# Patient Record
Sex: FEMALE | Race: WHITE | NOT HISPANIC OR LATINO | Employment: FULL TIME | URBAN - METROPOLITAN AREA
[De-identification: names, ages, dates, MRNs, and addresses within clinical notes are randomized per-mention and may not be internally consistent; named-entity substitution may affect disease eponyms.]

---

## 2017-02-01 ENCOUNTER — TRANSCRIBE ORDERS (OUTPATIENT)
Dept: ADMINISTRATIVE | Facility: HOSPITAL | Age: 57
End: 2017-02-01

## 2017-02-01 DIAGNOSIS — Z13.9 SCREENING: Primary | ICD-10-CM

## 2017-02-09 ENCOUNTER — HOSPITAL ENCOUNTER (OUTPATIENT)
Dept: RADIOLOGY | Facility: HOSPITAL | Age: 57
Discharge: HOME | End: 2017-02-09
Attending: OBSTETRICS & GYNECOLOGY
Payer: COMMERCIAL

## 2017-02-09 DIAGNOSIS — Z13.9 SCREENING: ICD-10-CM

## 2017-02-09 PROCEDURE — G0202 SCR MAMMO BI INCL CAD: HCPCS

## 2018-03-28 ENCOUNTER — TRANSCRIBE ORDERS (OUTPATIENT)
Dept: ADMINISTRATIVE | Facility: HOSPITAL | Age: 58
End: 2018-03-28

## 2018-03-28 DIAGNOSIS — Z12.39 SCREENING BREAST EXAMINATION: Primary | ICD-10-CM

## 2018-04-02 ENCOUNTER — HOSPITAL ENCOUNTER (OUTPATIENT)
Dept: RADIOLOGY | Facility: HOSPITAL | Age: 58
Discharge: HOME | End: 2018-04-02
Attending: OBSTETRICS & GYNECOLOGY
Payer: COMMERCIAL

## 2018-04-02 DIAGNOSIS — Z12.39 SCREENING BREAST EXAMINATION: ICD-10-CM

## 2018-04-02 PROCEDURE — 77067 SCR MAMMO BI INCL CAD: CPT

## 2018-11-28 ENCOUNTER — APPOINTMENT (OUTPATIENT)
Dept: RADIOLOGY | Facility: CLINIC | Age: 58
End: 2018-11-28
Payer: COMMERCIAL

## 2018-11-28 ENCOUNTER — OFFICE VISIT (OUTPATIENT)
Dept: OBGYN CLINIC | Facility: CLINIC | Age: 58
End: 2018-11-28
Payer: COMMERCIAL

## 2018-11-28 VITALS
WEIGHT: 172.2 LBS | SYSTOLIC BLOOD PRESSURE: 144 MMHG | HEART RATE: 87 BPM | DIASTOLIC BLOOD PRESSURE: 93 MMHG | HEIGHT: 60 IN | BODY MASS INDEX: 33.81 KG/M2

## 2018-11-28 DIAGNOSIS — M25.561 RIGHT KNEE PAIN, UNSPECIFIED CHRONICITY: ICD-10-CM

## 2018-11-28 DIAGNOSIS — M17.11 PRIMARY OSTEOARTHRITIS OF RIGHT KNEE: Primary | ICD-10-CM

## 2018-11-28 DIAGNOSIS — M22.2X1 PATELLOFEMORAL DISORDER OF RIGHT KNEE: ICD-10-CM

## 2018-11-28 PROCEDURE — 73562 X-RAY EXAM OF KNEE 3: CPT

## 2018-11-28 PROCEDURE — 20610 DRAIN/INJ JOINT/BURSA W/O US: CPT | Performed by: ORTHOPAEDIC SURGERY

## 2018-11-28 PROCEDURE — 99204 OFFICE O/P NEW MOD 45 MIN: CPT | Performed by: ORTHOPAEDIC SURGERY

## 2018-11-28 RX ORDER — DOXAZOSIN 2 MG/1
TABLET ORAL
Refills: 0 | COMMUNITY
Start: 2018-11-07

## 2018-11-28 RX ORDER — METOPROLOL SUCCINATE 50 MG/1
TABLET, EXTENDED RELEASE ORAL
Refills: 0 | COMMUNITY
Start: 2018-11-07

## 2018-11-28 RX ORDER — TRAZODONE HYDROCHLORIDE 50 MG/1
TABLET ORAL
Refills: 0 | COMMUNITY
Start: 2018-11-07

## 2018-11-28 RX ORDER — METFORMIN HYDROCHLORIDE 750 MG/1
1500 TABLET, EXTENDED RELEASE ORAL DAILY
Refills: 0 | COMMUNITY
Start: 2018-11-16

## 2018-11-28 RX ORDER — MOMETASONE FUROATE 50 UG/1
SPRAY, METERED NASAL
Refills: 0 | COMMUNITY
Start: 2018-10-31

## 2018-11-28 RX ORDER — TRIAMTERENE AND HYDROCHLOROTHIAZIDE 37.5; 25 MG/1; MG/1
CAPSULE ORAL
Refills: 0 | COMMUNITY
Start: 2018-10-31

## 2018-11-28 RX ORDER — TRIAMCINOLONE ACETONIDE 40 MG/ML
40 INJECTION, SUSPENSION INTRA-ARTICULAR; INTRAMUSCULAR
Status: COMPLETED | OUTPATIENT
Start: 2018-11-28 | End: 2018-11-28

## 2018-11-28 RX ORDER — BUPIVACAINE HYDROCHLORIDE 5 MG/ML
6 INJECTION, SOLUTION EPIDURAL; INTRACAUDAL
Status: COMPLETED | OUTPATIENT
Start: 2018-11-28 | End: 2018-11-28

## 2018-11-28 RX ORDER — ROSUVASTATIN CALCIUM 20 MG/1
TABLET, COATED ORAL
Refills: 0 | COMMUNITY
Start: 2018-10-31

## 2018-11-28 RX ORDER — LEVOTHYROXINE SODIUM 75 MCG
75 TABLET ORAL DAILY
Refills: 0 | COMMUNITY
Start: 2018-11-08

## 2018-11-28 RX ADMIN — BUPIVACAINE HYDROCHLORIDE 6 ML: 5 INJECTION, SOLUTION EPIDURAL; INTRACAUDAL at 15:16

## 2018-11-28 RX ADMIN — TRIAMCINOLONE ACETONIDE 40 MG: 40 INJECTION, SUSPENSION INTRA-ARTICULAR; INTRAMUSCULAR at 15:16

## 2018-11-28 NOTE — PROGRESS NOTES
Scribe Attestation    I,:   Meeta Randolph MA am acting as a scribe while in the presence of the attending physician :        I,:   Emili Hayden DO personally performed the services described in this documentation    as scribed in my presence :              Assessment/Plan:  1  Primary osteoarthritis of right knee     2  Right knee pain, unspecified chronicity  XR knee 3 vw right non injury   3  Patellofemoral disorder of right knee  Ambulatory referral to Physical Therapy    Brace     Avis Kee is a new patient who presents today with right knee pain  A discussion was had with the patient regarding patellofemoral arthritis of her right knee  She should modify her activities to low impact exercises such as swimming, biking, or use of the elliptical  The patient should also avoid kneeling, lifting or squatting activities  Several options were presented to the patient to help provide her with pain relief  Today, she was offered a daily anti inflammatory or to drain the fluid from her knee and to receive a steroid injection  The patient has opted for the aspiration and steroid injection today  Risks and benefits of the injection and post injection instructions were provided to the patient  A lateral J brace was also provided today in the office for he right knee  Avis Kee will also be referred to physical therapy 2-3x a week  She will follow up with me in 3 months time  All of her questions were addressed today     Subjective: Right knee pain     Patient ID: Nitin Pack is a 66 y o  female  HPI  Avis Kee is a new patient who presents today regarding her right knee pain  She reports this pain started 2 weeks ago  Avis Kee usually goes to the gym 2-3x a week and walks on the treadmill with an incline  She states while she did not notice any discomfort at the gym 2 weeks ago, she did wake up with pain in her right knee  The patient works as a  and is frequently on her feet   She does have pain while walking around and going up and down the stairs  At times she does feel some clicking in her knee  Today her pain is a 3/10  She has taken an over the counter anti inflammatory which did give her relief  She denies feeling unstable or any numbness and tingling  Review of Systems   Constitutional: Negative for chills, fever and unexpected weight change  HENT: Negative for hearing loss, nosebleeds and sore throat  Eyes: Negative for pain, redness and visual disturbance  Respiratory: Negative for cough, shortness of breath and wheezing  Cardiovascular: Negative for chest pain, palpitations and leg swelling  Gastrointestinal: Negative for abdominal pain, nausea and vomiting  Endocrine: Negative for polydipsia and polyuria  Genitourinary: Negative for dyspareunia and hematuria  Musculoskeletal: Positive for myalgias  Skin: Negative for rash and wound  Neurological: Negative for dizziness, numbness and headaches  Psychiatric/Behavioral: Negative for decreased concentration and suicidal ideas  The patient is not nervous/anxious  No past medical history on file  No past surgical history on file  Family History   Problem Relation Age of Onset    Hypertension Father        Social History     Occupational History    Not on file       Social History Main Topics    Smoking status: Former Smoker    Smokeless tobacco: Never Used    Alcohol use Not on file    Drug use: Unknown    Sexual activity: Not on file         Current Outpatient Prescriptions:     doxazosin (CARDURA) 2 mg tablet, , Disp: , Rfl: 0    metFORMIN (GLUCOPHAGE-XR) 750 mg 24 hr tablet, Take 1,500 mg by mouth daily, Disp: , Rfl: 0    metoprolol succinate (TOPROL-XL) 50 mg 24 hr tablet, , Disp: , Rfl: 0    mometasone (NASONEX) 50 mcg/act nasal spray, , Disp: , Rfl: 0    rosuvastatin (CRESTOR) 20 MG tablet, , Disp: , Rfl: 0    sertraline (ZOLOFT) 50 mg tablet, , Disp: , Rfl: 0    SYNTHROID 75 MCG tablet, Take 75 mcg by mouth daily, Disp: , Rfl: 0    traZODone (DESYREL) 50 mg tablet, , Disp: , Rfl: 0    triamterene-hydrochlorothiazide (DYAZIDE) 37 5-25 mg per capsule, , Disp: , Rfl: 0    Allergies   Allergen Reactions    Augmentin [Amoxicillin-Pot Clavulanate]     Cozaar [Losartan]        Objective:  Vitals:    11/28/18 1408   BP: 144/93   Pulse: 87       Body mass index is 33 63 kg/m²  Right Knee Exam     Tenderness   The patient is experiencing tenderness in the medial joint line (TTP lateral patella facet )  Range of Motion   Extension: 0   Flexion: 120     Tests   Kristyn:  Medial - negative Lateral - negative  Drawer:       Anterior - negative      Varus: negative  Valgus: negative  Patellar Apprehension: negative    Other   Erythema: absent  Scars: absent  Sensation: normal  Pulse: present  Swelling: none  Other tests: effusion (Trace) present    Comments:  Free and full ROM with crepitus noted   Postive para patellar crepitus   Positive patellofemoral grind   Negative appley's compression           Observations     Right Knee   Positive for effusion (Trace)  Physical Exam   Constitutional: She is oriented to person, place, and time  She appears well-developed and well-nourished  HENT:   Head: Normocephalic and atraumatic  Eyes: Conjunctivae are normal    Neck: Neck supple  Cardiovascular: Intact distal pulses  Pulmonary/Chest: Effort normal    Musculoskeletal:        Right knee: She exhibits effusion (Trace)  Neurological: She is alert and oriented to person, place, and time  Skin: Skin is warm and dry  Psychiatric: She has a normal mood and affect   Her behavior is normal      Large joint arthrocentesis  Date/Time: 11/28/2018 3:16 PM  Consent given by: patient  Site marked: site marked  Timeout: Immediately prior to procedure a time out was called to verify the correct patient, procedure, equipment, support staff and site/side marked as required   Supporting Documentation  Indications: pain and joint swelling   Procedure Details  Location: knee - R knee  Needle size: 20 G  Approach: Superior lateral   Medications administered: 6 mL bupivacaine (PF) 0 5 %; 40 mg triamcinolone acetonide 40 mg/mL    Aspirate amount (ml): 7 cc  Aspirate: yellow and clear (benign )  Fluid sample sent for laboratory analysis: Fluid was clear and benign in nature  Patient tolerance: patient tolerated the procedure well with no immediate complications  Dressing:  Sterile dressing applied        I have personally reviewed pertinent films in PACS  X-rays obtained here in the office today of the right knee were reviewed by me  They demonstrate mild osteoarthritis and tibial femoral compartments and moderate patellofemoral osteoarthritis with joint space asymmetry, lateral peripheral osteophyte formation and slight patellar tilt  No lytic or blastic lesion  No fracture appreciated  Antonieta JUNIOR    Division of Adult Reconstruction  Department of [facility]

## 2019-01-10 ENCOUNTER — EVALUATION (OUTPATIENT)
Dept: PHYSICAL THERAPY | Facility: CLINIC | Age: 59
End: 2019-01-10
Payer: COMMERCIAL

## 2019-01-10 DIAGNOSIS — M22.2X1 PATELLOFEMORAL DISORDER OF RIGHT KNEE: ICD-10-CM

## 2019-01-10 PROCEDURE — G8979 MOBILITY GOAL STATUS: HCPCS | Performed by: PHYSICAL THERAPIST

## 2019-01-10 PROCEDURE — 97161 PT EVAL LOW COMPLEX 20 MIN: CPT | Performed by: PHYSICAL THERAPIST

## 2019-01-10 PROCEDURE — G8978 MOBILITY CURRENT STATUS: HCPCS | Performed by: PHYSICAL THERAPIST

## 2019-01-10 NOTE — PROGRESS NOTES
PT Evaluation     Today's date: 1/10/2019  Patient name: Jovi Covarrubias  : 1960  MRN: [id number]  Referring provider: Trip Street DO  Dx:   Encounter Diagnosis     ICD-10-CM    1  Patellofemoral disorder of right knee M22 2X1 Ambulatory referral to Physical Therapy                  Assessment  Assessment details: Josy Beypreskerri with signs and symptoms consistent with Patellofemoral disorder of right knee, with loss of range of motion, strength and spinal stabilization  Presents with medium reactivity  Jovi Covarrubias would benefit with physical therapy to address these impairments to return to prior level of function  Impairments: abnormal or restricted ROM, activity intolerance, impaired physical strength, lacks appropriate home exercise program and pain with function    Goals  STG  Initiate HEP  Able to walk 1/2 mile without pain in 3 weeks  LTG  Independent with HEP  Able to walk 1 mile without pain in 6 weeks      Plan  Planned therapy interventions: neuromuscular re-education, patient education, strengthening, stretching, therapeutic exercise, balance and home exercise program  Frequency: 2x week  Duration in visits: 12  Duration in weeks: 6  Treatment plan discussed with: patient        Subjective Evaluation    History of Present Illness  Mechanism of injury: Patient reports developed right knee pain about 2 months ago, Dr Raymon Denny asperated the right knee and injected cortisone and it felt good for one month  The pain returned and now she is presenting to PT  Denies injury  She is very active with exercise and catering      Not a recurrent problem   Quality of life: good    Pain  Current pain ratin  At best pain ratin  Location: right knee  Quality: dull ache  Relieving factors: medications and rest  Aggravating factors: stair climbing, walking, running and lifting    Social Support  Steps to enter house: yes  Stairs in house: no   L[facility] in: [facility]  Lives with: spouse    Employment status: working  Hand dominance: right  Exercise history: Regular exercise    Treatments  Current treatment: physical therapy  Patient Goals  Patient goals for therapy: decreased pain, improved balance, increased motion, increased strength, independence with ADLs/IADLs and return to sport/leisure activities          Objective     Active Range of Motion   Left Knee   Flexion: 135 degrees   Extension: 0 degrees     Right Knee   Flexion: 135 degrees   Extension: 0 degrees     Strength/Myotome Testing     Left Hip   Planes of Motion   Flexion: 5  Extension: 5  Abduction: 5  External rotation: 5  Internal rotation: 5    Right Hip   Planes of Motion   Flexion: 4+  Extension: 4  Abduction: 4  External rotation: 4  Internal rotation: 4+    Left Knee   Flexion: 5  Extension: 5    Right Knee   Flexion: 4  Extension: 4      Flowsheet Rows      Most Recent Value   PT/OT G-Codes   Current Score  58   Projected Score  68   Assessment Type  Evaluation   G code set  Mobility: Walking & Moving Around   Mobility: Walking and Moving Around Current Status ()  CK   Mobility: Walking and Moving Around Goal Status ()  CJ          Precautions: HTN, DM    Daily Treatment Diary     Manual                                                                                   Exercise Diary  1/10            SLR F, ABD 2x10            Clamshells x10                                                                                                                                                                                                                                                          Modalities                                                       Patient instructed in HEP from [address] [place] [address] #   Catherine Mulugeta

## 2019-04-22 ENCOUNTER — TRANSCRIBE ORDERS (OUTPATIENT)
Dept: ADMINISTRATIVE | Facility: HOSPITAL | Age: 59
End: 2019-04-22

## 2019-04-22 DIAGNOSIS — Z12.39 BREAST SCREENING, UNSPECIFIED: Primary | ICD-10-CM

## 2019-04-26 ENCOUNTER — HOSPITAL ENCOUNTER (OUTPATIENT)
Dept: RADIOLOGY | Facility: HOSPITAL | Age: 59
Discharge: HOME | End: 2019-04-26
Attending: OBSTETRICS & GYNECOLOGY
Payer: COMMERCIAL

## 2019-04-26 VITALS — HEIGHT: 60 IN | BODY MASS INDEX: 33.77 KG/M2 | WEIGHT: 172 LBS

## 2019-04-26 DIAGNOSIS — Z12.39 BREAST SCREENING, UNSPECIFIED: ICD-10-CM

## 2019-04-26 PROCEDURE — 77067 SCR MAMMO BI INCL CAD: CPT

## 2019-04-26 PROCEDURE — 77063 BREAST TOMOSYNTHESIS BI: CPT

## 2020-06-10 ENCOUNTER — TRANSCRIBE ORDERS (OUTPATIENT)
Dept: ADMINISTRATIVE | Facility: HOSPITAL | Age: 60
End: 2020-06-10

## 2020-06-10 DIAGNOSIS — Z12.31 OTHER SCREENING MAMMOGRAM: Primary | ICD-10-CM

## 2020-08-04 ENCOUNTER — TRANSCRIBE ORDERS (OUTPATIENT)
Dept: ADMINISTRATIVE | Facility: HOSPITAL | Age: 60
End: 2020-08-04

## 2020-08-04 ENCOUNTER — HOSPITAL ENCOUNTER (OUTPATIENT)
Dept: RADIOLOGY | Facility: HOSPITAL | Age: 60
Discharge: HOME | End: 2020-08-04
Attending: OBSTETRICS & GYNECOLOGY
Payer: COMMERCIAL

## 2020-08-04 VITALS — BODY MASS INDEX: 33.77 KG/M2 | HEIGHT: 60 IN | WEIGHT: 172 LBS

## 2020-08-04 DIAGNOSIS — Z12.31 OTHER SCREENING MAMMOGRAM: ICD-10-CM

## 2020-08-04 PROCEDURE — 77067 SCR MAMMO BI INCL CAD: CPT

## 2020-08-04 PROCEDURE — 77063 BREAST TOMOSYNTHESIS BI: CPT

## 2021-04-05 ENCOUNTER — NURSE TRIAGE (OUTPATIENT)
Dept: OTHER | Facility: OTHER | Age: 61
End: 2021-04-05

## 2021-04-05 DIAGNOSIS — Z20.828 SARS-ASSOCIATED CORONAVIRUS EXPOSURE: Primary | ICD-10-CM

## 2021-04-05 NOTE — TELEPHONE ENCOUNTER
1  Were you within 6 feet or less, for up to 15 minutes or more with a person that has a confirmed COVID-19 test?  A coworker tested positive on 4/2/2021  2  What was the date of your exposure? Last date of contact was 4/1/2021  3  Are you experiencing any symptoms attributed to the virus?  (Assess for SOB, cough, fever, difficulty breathing)   Asymptomatic  4  HIGH RISK: Do you have any history heart or lung conditions, weakened immune system, diabetes, Asthma, CHF, HIV, COPD, Chemo, renal failure, sickle cell, etc?   Hypertension  5  PREGNANCY: Are you pregnant or did you recently give birth?  N/A

## 2021-04-05 NOTE — TELEPHONE ENCOUNTER
Regarding: ASYMPTOMATIC - EXPOSURE - COVID TEST REQUEST  ----- Message from [facility] sent at 4/5/2021 11:50 AM EDT -----  "I need to be tested due to coworker I work closely with tested positive, last known exposed Thursday 4/1   No symptoms " Concern due to  is immunocompromised

## 2021-04-07 PROCEDURE — U0005 INFEC AGEN DETEC AMPLI PROBE: HCPCS | Performed by: FAMILY MEDICINE

## 2021-04-07 PROCEDURE — U0003 INFECTIOUS AGENT DETECTION BY NUCLEIC ACID (DNA OR RNA); SEVERE ACUTE RESPIRATORY SYNDROME CORONAVIRUS 2 (SARS-COV-2) (CORONAVIRUS DISEASE [COVID-19]), AMPLIFIED PROBE TECHNIQUE, MAKING USE OF HIGH THROUGHPUT TECHNOLOGIES AS DESCRIBED BY CMS-2020-01-R: HCPCS | Performed by: FAMILY MEDICINE

## 2021-04-08 ENCOUNTER — TELEPHONE (OUTPATIENT)
Dept: OTHER | Facility: OTHER | Age: 61
End: 2021-04-08

## 2021-04-08 LAB — SARS-COV-2 RNA RESP QL NAA+PROBE: NEGATIVE

## 2021-04-08 NOTE — TELEPHONE ENCOUNTER
1st attempt  The patient was called for notification of a test result for COVID-19  The patient did not answer the phone and a voicemail was left requesting a call back to [phone], Option 7

## 2021-04-08 NOTE — TELEPHONE ENCOUNTER
2nd attempt  Pt removed from results queue  The patient was called for notification of a test result for COVID-19  The patient did not answer the phone and a voicemail was left requesting a call back to [phone], Option 7

## 2021-09-22 ENCOUNTER — HOSPITAL ENCOUNTER (OUTPATIENT)
Dept: RADIOLOGY | Facility: HOSPITAL | Age: 61
Discharge: HOME | End: 2021-09-22
Payer: COMMERCIAL

## 2021-09-22 VITALS — WEIGHT: 165 LBS | HEIGHT: 60 IN | BODY MASS INDEX: 32.39 KG/M2

## 2021-09-22 DIAGNOSIS — Z12.31 ENCOUNTER FOR SCREENING MAMMOGRAM FOR MALIGNANT NEOPLASM OF BREAST: ICD-10-CM

## 2021-09-22 PROCEDURE — 77067 SCR MAMMO BI INCL CAD: CPT

## 2021-09-22 PROCEDURE — 77063 BREAST TOMOSYNTHESIS BI: CPT

## 2021-10-05 ENCOUNTER — TELEPHONE (OUTPATIENT)
Dept: GASTROENTEROLOGY | Facility: CLINIC | Age: 61
End: 2021-10-05

## 2022-01-24 ENCOUNTER — OFFICE VISIT (OUTPATIENT)
Dept: URGENT CARE | Facility: CLINIC | Age: 62
End: 2022-01-24
Payer: COMMERCIAL

## 2022-01-24 VITALS
HEART RATE: 82 BPM | SYSTOLIC BLOOD PRESSURE: 185 MMHG | OXYGEN SATURATION: 98 % | TEMPERATURE: 96.1 F | DIASTOLIC BLOOD PRESSURE: 89 MMHG | RESPIRATION RATE: 18 BRPM

## 2022-01-24 DIAGNOSIS — R51.9 FACIAL PAIN: Primary | ICD-10-CM

## 2022-01-24 PROCEDURE — 99214 OFFICE O/P EST MOD 30 MIN: CPT | Performed by: PHYSICIAN ASSISTANT

## 2022-01-24 PROCEDURE — U0003 INFECTIOUS AGENT DETECTION BY NUCLEIC ACID (DNA OR RNA); SEVERE ACUTE RESPIRATORY SYNDROME CORONAVIRUS 2 (SARS-COV-2) (CORONAVIRUS DISEASE [COVID-19]), AMPLIFIED PROBE TECHNIQUE, MAKING USE OF HIGH THROUGHPUT TECHNOLOGIES AS DESCRIBED BY CMS-2020-01-R: HCPCS | Performed by: PHYSICIAN ASSISTANT

## 2022-01-24 PROCEDURE — U0005 INFEC AGEN DETEC AMPLI PROBE: HCPCS | Performed by: PHYSICIAN ASSISTANT

## 2022-01-24 RX ORDER — DOXYCYCLINE 100 MG/1
100 TABLET ORAL 2 TIMES DAILY
Qty: 14 TABLET | Refills: 0 | Status: SHIPPED | OUTPATIENT
Start: 2022-01-24 | End: 2022-01-31

## 2022-01-24 NOTE — PROGRESS NOTES
[facility]        NAME: Lamonte Vasquez is a 63 y o  female  : 1960    MRN: [id number]  DATE: 2022  TIME: 5:43 PM    Assessment and Plan   Facial pain [R51 9]  1  Facial pain  doxycycline (ADOXA) 100 MG tablet         Patient Instructions   Patient Instructions     Doxy 2x a day for 7 days     A sinus infection is most often caused by a virus  Treatment for a sinus infection focuses on symptomatic management as it typically resolves within 7 to 10 days  There are no treatments to shorten the clinical course of the disease  Patients with a viral sinus infection should be managed with supportive care only  Bacterial infection occurs in only 0 5 to 2 percent of episodes of sinus infections  Acute bacterial sinus infections may also be a self-limited disease and resolve without antibiotics  However, if your symptoms do last past 7 days you may call the office back and I will send in an antibiotic for you  According to the [address] [place] [address] [employer]- [employer] patients with a sinus infection should have a seven day waiting period with symptomatic management  Upon day seven if there is no improvement an antibiotic should then be initiated  Symptomatic management:     - Nasal congestion: Nasal irrigation with nasal saline or intranasal glucocorticoids (FLONASE)           Short course of oral decongestants (Sudafed) 3-5 days           (Guaifenesin) may help thin secretions and may promote ease of mucus drainage and clearance           Inhalation of warm, humidified air (steam), may provide patients with a transient sense of relief of congestion                - Pain and Fever: Over-the-counter analgesics and antipyretics such as nonsteroidal antiinflammatory drugs (NSAIDs) and acetaminophen may be used                     Hypertensive Crisis   WHAT YOU NEED TO KNOW:   A hypertensive crisis is a sudden spike in blood pressure to 180/120 or higher   A normal blood pressure is 119/79 or lower  A hypertensive crisis is also known as acute hypertension  This is a medical emergency that could lead to organ damage or be life-threatening  DISCHARGE INSTRUCTIONS:   Call 911 for any of the following:   · You have chest    · You have back pain or shortness of breath  · You have weakness or numbness in your face, arms, or legs  · You cannot see or talk as well as usual     Return to the emergency department if:   · You have a severe headache  Contact your healthcare provider if:   · Your blood pressure is 180/110 or higher but you have no other symptoms  · You have questions or concerns about your condition or care  Medicines: You may need any of the following:  · Blood pressure medicine  is given to lower your blood pressure  There are many different types of blood pressure medicine, and you may need more than one type  It is very important to take your blood pressure medicine exactly as directed  Skipped doses can lead to a hypertensive crisis  Talk to your healthcare provider if you are having side effects from your medicine  Do not  stop taking it without talking to your healthcare provider  · Diuretics  help decrease extra fluid that collects in your blood vessels  This lowers your blood pressure by reducing pressure in your arteries  Diuretics are often called water pills  You may urinate more often while you take this medicine  · Take your medicine as directed  Contact your healthcare provider if you think your medicine is not helping or if you have side effects  Tell him of her if you are allergic to any medicine  Keep a list of the medicines, vitamins, and herbs you take  Include the amounts, and when and why you take them  Bring the list or the pill bottles to follow-up visits  Carry your medicine list with you in case of an emergency  Follow up with your healthcare provider within 1 to 5 days or as directed:   You will need to return to have your blood pressure checked and other tests  Your healthcare provider may also refer to you a cardiologist  Write down your questions so you remember to ask them during your visits  Prevent another hypertensive crisis:   · Check your blood pressure at home  Sit and rest for 5 minutes before you take your blood pressure  Extend your arm and support it on a flat surface  Your arm should be at the same level as your heart  Follow the directions that came with your blood pressure monitor  If possible, take at least 2 blood pressure readings each time  Take your blood pressure at least twice a day at the same times each day, such as morning and evening  Keep a record of your readings and bring it to your follow-up visits  Ask your healthcare provider what your blood pressure should be  · Manage any other health conditions you have  Health conditions such as diabetes can increase your risk for hypertension  Follow your healthcare provider's instructions and take all your medicines as directed  · Ask about all medicines  Certain medicines can increase your blood pressure  Examples include oral birth control pills, decongestants, herbal supplements, and NSAIDs, such as ibuprofen  Your healthcare provider can tell you which medicines are safe for you to take  This includes prescription and over-the-counter medicines  · Limit sodium (salt) as directed  Too much sodium can affect your fluid balance  Check labels to find low-sodium or no-salt-added foods  Some low-sodium foods use potassium salts for flavor  Too much potassium can also cause health problems  Your healthcare provider will tell you how much sodium and potassium are safe for you to have in a day  He or she may recommend that you limit sodium to 2,300 mg a day  · Follow the meal plan recommended by your healthcare provider    A dietitian or your provider can give you more information on low-sodium plans or the DASH (Dietary Approaches to Stop Hypertension) eating plan  The DASH plan is low in sodium, unhealthy fats, and total fat  It is high in potassium, calcium, and fiber  · Exercise to maintain a healthy weight  Exercise at least 30 minutes per day, on most days of the week  This will help decrease your blood pressure  Ask your healthcare provider about the best exercise plan for you  · Decrease stress  This may help lower your blood pressure  Learn ways to relax, such as deep breathing or listening to music  · Limit alcohol as directed  Alcohol can increase your blood pressure  A drink of alcohol is 12 ounces of beer, 5 ounces of wine, or 1½ ounces of liquor  · Do not smoke  Nicotine and other chemicals in cigarettes and cigars can increase your blood pressure and also cause lung damage  Ask your healthcare provider for information if you currently smoke and need help to quit  E-cigarettes or smokeless tobacco still contain nicotine  Talk to your healthcare provider before you use these products  © Copyright [employer] 2021 Information is for End User's use only and may not be sold, redistributed or otherwise used for commercial purposes  All illustrations and images included in CareNotes® are the copyrighted property of A D A M , Inc  or [facility]  The above information is an  only  It is not intended as medical advice for individual conditions or treatments  Talk to your doctor, nurse or pharmacist before following any medical regimen to see if it is safe and effective for you  Follow up with PCP in 3-5 days  Proceed to  ER if symptoms worsen  Chief Complaint     Chief Complaint   Patient presents with    Sinusitis     FACIAL PAIN, NECK PAIN,HEADACHE TOOK ADVIL          History of Present Illness       The pt was a 61-year-old female presenting with a headache, facial pain and neck pain x 5-6 days  She reports getting this every year   PT does have a Pmh of HTN        Review of Systems   Review of Systems   Constitutional: Negative for activity change, appetite change, chills, fatigue and fever  HENT: Positive for congestion  Negative for rhinorrhea, sinus pressure, sinus pain and sore throat  Facial pain and pressure      Respiratory: Negative for cough, chest tightness and shortness of breath  Cardiovascular: Negative for chest pain and palpitations  Gastrointestinal: Negative for diarrhea, nausea and vomiting  Musculoskeletal: Positive for neck pain  Negative for arthralgias and myalgias  Skin: Negative for color change and pallor  Neurological: Positive for headaches  Current Medications       Current Outpatient Medications:     doxazosin (CARDURA) 2 mg tablet, , Disp: , Rfl: 0    metFORMIN (GLUCOPHAGE-XR) 750 mg 24 hr tablet, Take 1,500 mg by mouth daily, Disp: , Rfl: 0    metoprolol succinate (TOPROL-XL) 50 mg 24 hr tablet, , Disp: , Rfl: 0    mometasone (NASONEX) 50 mcg/act nasal spray, , Disp: , Rfl: 0    rosuvastatin (CRESTOR) 20 MG tablet, , Disp: , Rfl: 0    sertraline (ZOLOFT) 50 mg tablet, , Disp: , Rfl: 0    SYNTHROID 75 MCG tablet, Take 75 mcg by mouth daily, Disp: , Rfl: 0    traZODone (DESYREL) 50 mg tablet, , Disp: , Rfl: 0    triamterene-hydrochlorothiazide (DYAZIDE) 37 5-25 mg per capsule, , Disp: , Rfl: 0    doxycycline (ADOXA) 100 MG tablet, Take 1 tablet (100 mg total) by mouth 2 (two) times a day for 7 days, Disp: 14 tablet, Rfl: 0    Current Allergies     Allergies as of 01/24/2022 - Reviewed 01/24/2022   Allergen Reaction Noted    Augmentin [amoxicillin-pot clavulanate]  11/28/2018    Cozaar [losartan]  11/28/2018            The following portions of the patient's history were reviewed and updated as appropriate: allergies, current medications, past family history, past medical history, past social history, past surgical history and problem list      No past medical history on file      No past surgical history on file  Family History   Problem Relation Age of Onset    Hypertension Father     No Known Problems Paternal Aunt          Medications have been verified  Objective   BP (!) 185/89   Pulse 82   Temp (!) 96 1 °F (35 6 °C)   Resp 18   SpO2 98%        Physical Exam     Physical Exam  Vitals and nursing note reviewed  Constitutional:       General: She is not in acute distress  Appearance: She is normal weight  She is not ill-appearing, toxic-appearing or diaphoretic  Comments: Pt appears to be in pain     HENT:      Head: Normocephalic and atraumatic  Right Ear: Tympanic membrane, ear canal and external ear normal  There is no impacted cerumen  Left Ear: Tympanic membrane, ear canal and external ear normal  There is no impacted cerumen  Nose: Nose normal  No congestion or rhinorrhea  Mouth/Throat:      Mouth: Mucous membranes are moist       Pharynx: Oropharynx is clear  No oropharyngeal exudate or posterior oropharyngeal erythema  Eyes:      General:         Right eye: No discharge  Left eye: No discharge  Extraocular Movements: Extraocular movements intact  Conjunctiva/sclera: Conjunctivae normal       Pupils: Pupils are equal, round, and reactive to light  Cardiovascular:      Rate and Rhythm: Normal rate and regular rhythm  Heart sounds: Normal heart sounds  No murmur heard  No friction rub  No gallop  Pulmonary:      Effort: Pulmonary effort is normal  No respiratory distress  Breath sounds: Normal breath sounds  No stridor  No wheezing, rhonchi or rales  Chest:      Chest wall: No tenderness  Abdominal:      General: Abdomen is flat  Bowel sounds are normal  There is no distension  Palpations: Abdomen is soft  There is no mass  Tenderness: There is no abdominal tenderness  There is no guarding or rebound  Hernia: No hernia is present  Musculoskeletal:         General: Normal range of motion     Skin: General: Skin is warm and dry  Capillary Refill: Capillary refill takes less than 2 seconds  Neurological:      Mental Status: She is alert

## 2022-01-24 NOTE — PATIENT INSTRUCTIONS
Doxy 2x a day for 7 days     A sinus infection is most often caused by a virus  Treatment for a sinus infection focuses on symptomatic management as it typically resolves within 7 to 10 days  There are no treatments to shorten the clinical course of the disease  Patients with a viral sinus infection should be managed with supportive care only  Bacterial infection occurs in only 0 5 to 2 percent of episodes of sinus infections  Acute bacterial sinus infections may also be a self-limited disease and resolve without antibiotics  However, if your symptoms do last past 7 days you may call the office back and I will send in an antibiotic for you  According to the [address] [place] [address] [employer]- [employer]ck [employer] patients with a sinus infection should have a seven day waiting period with symptomatic management  Upon day seven if there is no improvement an antibiotic should then be initiated  Symptomatic management:     - Nasal congestion: Nasal irrigation with nasal saline or intranasal glucocorticoids (FLONASE)           Short course of oral decongestants (Sudafed) 3-5 days           (Guaifenesin) may help thin secretions and may promote ease of mucus drainage and clearance           Inhalation of warm, humidified air (steam), may provide patients with a transient sense of relief of congestion                - Pain and Fever: Over-the-counter analgesics and antipyretics such as nonsteroidal antiinflammatory drugs (NSAIDs) and acetaminophen may be used                     Hypertensive Crisis   WHAT YOU NEED TO KNOW:   A hypertensive crisis is a sudden spike in blood pressure to 180/120 or higher  A normal blood pressure is 119/79 or lower  A hypertensive crisis is also known as acute hypertension  This is a medical emergency that could lead to organ damage or be life-threatening         DISCHARGE INSTRUCTIONS:   Call 911 for any of the following:   · You have chest    · You have back pain or shortness of breath  · You have weakness or numbness in your face, arms, or legs  · You cannot see or talk as well as usual     Return to the emergency department if:   · You have a severe headache  Contact your healthcare provider if:   · Your blood pressure is 180/110 or higher but you have no other symptoms  · You have questions or concerns about your condition or care  Medicines: You may need any of the following:  · Blood pressure medicine  is given to lower your blood pressure  There are many different types of blood pressure medicine, and you may need more than one type  It is very important to take your blood pressure medicine exactly as directed  Skipped doses can lead to a hypertensive crisis  Talk to your healthcare provider if you are having side effects from your medicine  Do not  stop taking it without talking to your healthcare provider  · Diuretics  help decrease extra fluid that collects in your blood vessels  This lowers your blood pressure by reducing pressure in your arteries  Diuretics are often called water pills  You may urinate more often while you take this medicine  · Take your medicine as directed  Contact your healthcare provider if you think your medicine is not helping or if you have side effects  Tell him of her if you are allergic to any medicine  Keep a list of the medicines, vitamins, and herbs you take  Include the amounts, and when and why you take them  Bring the list or the pill bottles to follow-up visits  Carry your medicine list with you in case of an emergency  Follow up with your healthcare provider within 1 to 5 days or as directed: You will need to return to have your blood pressure checked and other tests  Your healthcare provider may also refer to you a cardiologist  Write down your questions so you remember to ask them during your visits  Prevent another hypertensive crisis:   · Check your blood pressure at home    Sit and rest for 5 minutes before you take your blood pressure  Extend your arm and support it on a flat surface  Your arm should be at the same level as your heart  Follow the directions that came with your blood pressure monitor  If possible, take at least 2 blood pressure readings each time  Take your blood pressure at least twice a day at the same times each day, such as morning and evening  Keep a record of your readings and bring it to your follow-up visits  Ask your healthcare provider what your blood pressure should be  · Manage any other health conditions you have  Health conditions such as diabetes can increase your risk for hypertension  Follow your healthcare provider's instructions and take all your medicines as directed  · Ask about all medicines  Certain medicines can increase your blood pressure  Examples include oral birth control pills, decongestants, herbal supplements, and NSAIDs, such as ibuprofen  Your healthcare provider can tell you which medicines are safe for you to take  This includes prescription and over-the-counter medicines  · Limit sodium (salt) as directed  Too much sodium can affect your fluid balance  Check labels to find low-sodium or no-salt-added foods  Some low-sodium foods use potassium salts for flavor  Too much potassium can also cause health problems  Your healthcare provider will tell you how much sodium and potassium are safe for you to have in a day  He or she may recommend that you limit sodium to 2,300 mg a day  · Follow the meal plan recommended by your healthcare provider  A dietitian or your provider can give you more information on low-sodium plans or the DASH (Dietary Approaches to Stop Hypertension) eating plan  The DASH plan is low in sodium, unhealthy fats, and total fat  It is high in potassium, calcium, and fiber  · Exercise to maintain a healthy weight  Exercise at least 30 minutes per day, on most days of the week   This will help decrease your blood pressure  Ask your healthcare provider about the best exercise plan for you  · Decrease stress  This may help lower your blood pressure  Learn ways to relax, such as deep breathing or listening to music  · Limit alcohol as directed  Alcohol can increase your blood pressure  A drink of alcohol is 12 ounces of beer, 5 ounces of wine, or 1½ ounces of liquor  · Do not smoke  Nicotine and other chemicals in cigarettes and cigars can increase your blood pressure and also cause lung damage  Ask your healthcare provider for information if you currently smoke and need help to quit  E-cigarettes or smokeless tobacco still contain nicotine  Talk to your healthcare provider before you use these products  © Copyright [employer] 2021 Information is for End User's use only and may not be sold, redistributed or otherwise used for commercial purposes  All illustrations and images included in CareNotes® are the copyrighted property of [employer]  or [facility] Elana Smith   The above information is an  only  It is not intended as medical advice for individual conditions or treatments  Talk to your doctor, nurse or pharmacist before following any medical regimen to see if it is safe and effective for you

## 2022-01-25 LAB — SARS-COV-2 RNA RESP QL NAA+PROBE: NEGATIVE

## 2022-07-11 ENCOUNTER — APPOINTMENT (OUTPATIENT)
Dept: LAB | Facility: CLINIC | Age: 62
End: 2022-07-11
Payer: COMMERCIAL

## 2022-09-22 ENCOUNTER — APPOINTMENT (OUTPATIENT)
Dept: LAB | Facility: CLINIC | Age: 62
End: 2022-09-22
Payer: COMMERCIAL

## 2022-12-12 ENCOUNTER — OFFICE VISIT (OUTPATIENT)
Dept: URGENT CARE | Facility: CLINIC | Age: 62
End: 2022-12-12

## 2022-12-12 VITALS
WEIGHT: 61 LBS | TEMPERATURE: 98 F | BODY MASS INDEX: 11.98 KG/M2 | HEIGHT: 60 IN | OXYGEN SATURATION: 97 % | RESPIRATION RATE: 18 BRPM | HEART RATE: 102 BPM

## 2022-12-12 DIAGNOSIS — B34.9 VIRAL SYNDROME: Primary | ICD-10-CM

## 2022-12-12 RX ORDER — BENZONATATE 100 MG/1
100 CAPSULE ORAL 3 TIMES DAILY PRN
Qty: 20 CAPSULE | Refills: 0 | Status: SHIPPED | OUTPATIENT
Start: 2022-12-12

## 2022-12-12 RX ORDER — OSELTAMIVIR PHOSPHATE 75 MG/1
75 CAPSULE ORAL EVERY 12 HOURS SCHEDULED
Qty: 10 CAPSULE | Refills: 0 | Status: SHIPPED | OUTPATIENT
Start: 2022-12-12 | End: 2022-12-17

## 2022-12-12 NOTE — LETTER
December 12, 2022     Patient: Genaro Solis  YOB: 1960  Date of Visit: 12/12/2022      To Whom it May Concern:    Chong Garcia is under my professional care  Vee Carr was seen in my office on 12/12/2022  Vee Carr may return to work on once she is 24 hours fever free pending COVID results and if she is feeling better       If you have any questions or concerns, please don't hesitate to call           Sincerely,          Salem CASSIDY Garcia        CC: No Recipients Bilobed Flap Text: The defect edges were debeveled with a #15 scalpel blade.  Given the location of the defect and the proximity to free margins a bilobe flap was deemed most appropriate.  Using a sterile surgical marker, an appropriate bilobe flap drawn around the defect.    The area thus outlined was incised deep to adipose tissue with a #15 scalpel blade.  The skin margins were undermined to an appropriate distance in all directions utilizing iris scissors.

## 2022-12-12 NOTE — PROGRESS NOTES
[facility]        NAME: Marita Maynard is a 63 y o  female  : 1960    MRN: [id number]  DATE: 2022  TIME: 4:23 PM    Assessment and Plan   Viral syndrome [B34 9]  1  Viral syndrome  Covid/Flu-Office Collect    oseltamivir (TAMIFLU) 75 mg capsule    benzonatate (TESSALON PERLES) 100 mg capsule            Patient Instructions   Patient Instructions   Tessalon for cough   Tamiflu if you test positive for flu         Follow up with PCP in 3-5 days  Proceed to  ER if symptoms worsen  Chief Complaint     Chief Complaint   Patient presents with   • Cough     Cough, sore throat,runny nose         History of Present Illness       The pt is a 53-year-old female presenting for a cough, sore throat and rhinorrhea  Today in the office she is sweating and reported taking advil  1 epsiode of vomiting this am        Review of Systems   Review of Systems   Constitutional: Negative for activity change, appetite change, chills, fatigue and fever  HENT: Positive for rhinorrhea and sore throat  Negative for congestion, ear pain, sinus pressure and sinus pain  Eyes: Negative for pain and visual disturbance  Respiratory: Positive for cough  Negative for chest tightness and shortness of breath  Cardiovascular: Negative for chest pain and palpitations  Gastrointestinal: Positive for vomiting  Negative for abdominal pain, diarrhea and nausea  Genitourinary: Negative for dysuria and hematuria  Musculoskeletal: Negative for arthralgias, back pain and myalgias  Skin: Negative for color change, pallor and rash  Neurological: Negative for seizures, syncope and headaches  All other systems reviewed and are negative          Current Medications       Current Outpatient Medications:   •  benzonatate (TESSALON PERLES) 100 mg capsule, Take 1 capsule (100 mg total) by mouth 3 (three) times a day as needed for cough, Disp: 20 capsule, Rfl: 0  •  doxazosin (CARDURA) 2 mg tablet, , Disp: , Rfl: 0  •  metFORMIN (GLUCOPHAGE-XR) 750 mg 24 hr tablet, Take 1,500 mg by mouth daily, Disp: , Rfl: 0  •  metoprolol succinate (TOPROL-XL) 50 mg 24 hr tablet, , Disp: , Rfl: 0  •  mometasone (NASONEX) 50 mcg/act nasal spray, , Disp: , Rfl: 0  •  oseltamivir (TAMIFLU) 75 mg capsule, Take 1 capsule (75 mg total) by mouth every 12 (twelve) hours for 5 days, Disp: 10 capsule, Rfl: 0  •  rosuvastatin (CRESTOR) 20 MG tablet, , Disp: , Rfl: 0  •  sertraline (ZOLOFT) 50 mg tablet, , Disp: , Rfl: 0  •  SYNTHROID 75 MCG tablet, Take 75 mcg by mouth daily, Disp: , Rfl: 0  •  traZODone (DESYREL) 50 mg tablet, , Disp: , Rfl: 0  •  triamterene-hydrochlorothiazide (DYAZIDE) 37 5-25 mg per capsule, , Disp: , Rfl: 0    Current Allergies     Allergies as of 12/12/2022 - Reviewed 12/12/2022   Allergen Reaction Noted   • Augmentin [amoxicillin-pot clavulanate]  11/28/2018   • Cozaar [losartan]  11/28/2018            The following portions of the patient's history were reviewed and updated as appropriate: allergies, current medications, past family history, past medical history, past social history, past surgical history and problem list      No past medical history on file  No past surgical history on file  Family History   Problem Relation Age of Onset   • Hypertension Father    • No Known Problems Paternal Aunt          Medications have been verified  Objective   Pulse 102   Temp 98 °F (36 7 °C)   Resp 18   Ht 5' (1 524 m)   Wt 27 7 kg (61 lb)   SpO2 97%   BMI 11 91 kg/m²        Physical Exam     Physical Exam  Vitals and nursing note reviewed  Constitutional:       General: She is not in acute distress  Appearance: Normal appearance  She is well-developed and normal weight  She is not ill-appearing, toxic-appearing or diaphoretic  HENT:      Head: Normocephalic and atraumatic  Right Ear: Tympanic membrane, ear canal and external ear normal  No drainage, swelling or tenderness  No middle ear effusion  Tympanic membrane is not erythematous  Left Ear: Tympanic membrane, ear canal and external ear normal  No drainage, swelling or tenderness  No middle ear effusion  Tympanic membrane is not erythematous  Nose: Nose normal  No congestion or rhinorrhea  Mouth/Throat:      Mouth: Mucous membranes are moist  No oral lesions  Pharynx: Oropharynx is clear  Uvula midline  No pharyngeal swelling, oropharyngeal exudate, posterior oropharyngeal erythema or uvula swelling  Tonsils: No tonsillar exudate or tonsillar abscesses  0 on the right  0 on the left  Eyes:      Extraocular Movements:      Right eye: Normal extraocular motion  Left eye: Normal extraocular motion  Conjunctiva/sclera: Conjunctivae normal       Pupils: Pupils are equal, round, and reactive to light  Cardiovascular:      Rate and Rhythm: Normal rate and regular rhythm  Heart sounds: Normal heart sounds  No murmur heard  No friction rub  No gallop  Pulmonary:      Effort: Pulmonary effort is normal  No respiratory distress  Breath sounds: Normal breath sounds  No stridor  No wheezing, rhonchi or rales  Chest:      Chest wall: No tenderness  Musculoskeletal:         General: Normal range of motion  Skin:     General: Skin is warm and dry  Capillary Refill: Capillary refill takes less than 2 seconds  Neurological:      Mental Status: She is alert

## 2022-12-13 LAB
FLUAV RNA RESP QL NAA+PROBE: POSITIVE
FLUBV RNA RESP QL NAA+PROBE: NEGATIVE
SARS-COV-2 RNA RESP QL NAA+PROBE: NEGATIVE

## 2023-03-23 ENCOUNTER — OFFICE VISIT (OUTPATIENT)
Dept: OBGYN CLINIC | Facility: CLINIC | Age: 63
End: 2023-03-23

## 2023-03-23 ENCOUNTER — HOSPITAL ENCOUNTER (OUTPATIENT)
Dept: RADIOLOGY | Facility: HOSPITAL | Age: 63
Discharge: HOME | End: 2023-03-23

## 2023-03-23 VITALS — BODY MASS INDEX: 32.39 KG/M2 | WEIGHT: 165 LBS | HEIGHT: 60 IN

## 2023-03-23 VITALS
DIASTOLIC BLOOD PRESSURE: 82 MMHG | SYSTOLIC BLOOD PRESSURE: 144 MMHG | BODY MASS INDEX: 31.61 KG/M2 | WEIGHT: 161 LBS | HEIGHT: 60 IN

## 2023-03-23 DIAGNOSIS — Z12.11 SCREENING FOR COLON CANCER: ICD-10-CM

## 2023-03-23 DIAGNOSIS — N90.7 SEBACEOUS CYST OF LABIA: ICD-10-CM

## 2023-03-23 DIAGNOSIS — Z12.31 ENCOUNTER FOR SCREENING MAMMOGRAM FOR MALIGNANT NEOPLASM OF BREAST: ICD-10-CM

## 2023-03-23 DIAGNOSIS — Z01.419 WOMEN'S ANNUAL ROUTINE GYNECOLOGICAL EXAMINATION: Primary | ICD-10-CM

## 2023-03-23 DIAGNOSIS — E11.9 DIABETES MELLITUS WITH NO COMPLICATION (HCC): ICD-10-CM

## 2023-03-23 NOTE — PROGRESS NOTES
[facility]  Well Woman Annual Exam  Erinn Romero is a 63 y o  postmenopausal female presenting for annual exam      Plan   • Pap history  uncomplicated- Reviewed ASCCP guidelines of disc ontinuing pap smears at age 72 in low risk patients with normal cervical cancer screening in the last 10 years  • [employer] [place] is low risk and does not desire or need STI testing today  • Last Mammogram: 2023, Reviewed ACOG recommendations of yearly mammogram for breast cancer screening   • Last Colonoscopy--NA, desires cologuard, ordered  • I reviewed the patient’s risk factors for osteoporosis and DEXA not ordered   • I emphasized the importance of an annual pelvic and breast exam    • I have discussed the importance of monthly self-breast exams, exercise and healthy diet as well as adequate intake of calcium and vitamin D      • All questions answered to the best of my ability     __________________________________________________________________      Zahra Laureano is a 63 y o  postmenopausal female presenting for annual exam      GYN  Menopause occurred approx 14 years ago  No bleeding  No vaginal discharge or pelvic pain  S/p salpingectomy  Menopausal symptoms: no    Sexually active: yes with , 23 years  Lives with  and two cats    Hx STI: denies   Hx Abnormal pap: yes, very remote in 25s  Last pap: approx 1-2 years ago    OB         Complaints: denies  Denies hematuria, urinary incontinence and dysuria    BREAST  Complaints: denies  Denies: breast lump, nipple discharge and skin lesion(s)  Last mammogram: 3/2023    Family hx: denies fhx of breast, uterine, ovarian, or colon cancers    GENERAL  PMH reviewed/updated and is as below  Patient does follow with a PCP  Works as a works for [employer]  Denies domestic violence      Exercise: goes to [employer] 2-3x/week  Diet: takes multivitamin and vitamin D      Past Medical History:   Diagnosis Date   • Borderline diabetes    • High blood pressure        Past Surgical History:   Procedure Laterality Date   • HEMORRHOID SURGERY     • TONSILLECTOMY     • TUBAL LIGATION           Current Outpatient Medications:   •  benzonatate (TESSALON PERLES) 100 mg capsule, Take 1 capsule (100 mg total) by mouth 3 (three) times a day as needed for cough, Disp: 20 capsule, Rfl: 0  •  doxazosin (CARDURA) 2 mg tablet, , Disp: , Rfl: 0  •  metFORMIN (GLUCOPHAGE-XR) 750 mg 24 hr tablet, Take 1,500 mg by mouth daily, Disp: , Rfl: 0  •  metoprolol succinate (TOPROL-XL) 50 mg 24 hr tablet, , Disp: , Rfl: 0  •  mometasone (NASONEX) 50 mcg/act nasal spray, , Disp: , Rfl: 0  •  rosuvastatin (CRESTOR) 20 MG tablet, , Disp: , Rfl: 0  •  sertraline (ZOLOFT) 50 mg tablet, , Disp: , Rfl: 0  •  SYNTHROID 75 MCG tablet, Take 75 mcg by mouth daily, Disp: , Rfl: 0  •  traZODone (DESYREL) 50 mg tablet, , Disp: , Rfl: 0  •  triamterene-hydrochlorothiazide (DYAZIDE) 37 5-25 mg per capsule, , Disp: , Rfl: 0    Allergies   Allergen Reactions   • Augmentin [Amoxicillin-Pot Clavulanate]    • Cozaar [Losartan]        Social History     Socioeconomic History   • Marital status: /Civil Union     Spouse name: Not on file   • Number of children: Not on file   • Years of education: Not on file   • Highest education level: Not on file   Occupational History   • Not on file   Tobacco Use   • Smoking status: Former   • Smokeless tobacco: Never   Vaping Use   • Vaping Use: Former   Substance and Sexual Activity   • Alcohol use: Yes     Comment: 3-4 x week   • Drug use: Never   • Sexual activity: Yes     Birth control/protection: Female Sterilization   Other Topics Concern   • Not on file   Social History Narrative   • Not on file     Social Determinants of Health     Financial Resource Strain: Not on file   Food Insecurity: Not on file   Transportation Needs: Not on file   Physical Activity: Not on file   Stress: Not on file   Social Connections: Not on file   Intimate Partner Violence: Not on file   Housing Stability: Not on file            Review of Systems  Review of Systems   Constitutional: Negative for unexpected weight change  Respiratory: Negative for shortness of breath  Cardiovascular: Negative for chest pain  Gastrointestinal: Negative for abdominal pain  Genitourinary: Negative for difficulty urinating and pelvic pain  Objective  /82 (BP Location: Right arm, Patient Position: Sitting)   Ht 5' (1 524 m)   Wt 73 kg (161 lb)   BMI 31 44 kg/m²      Physical Exam:  Physical Exam  Constitutional:       General: She is not in acute distress  Appearance: Normal appearance  She is not ill-appearing  HENT:      Head: Normocephalic and atraumatic  Eyes:      Extraocular Movements: Extraocular movements intact  Conjunctiva/sclera: Conjunctivae normal    Cardiovascular:      Rate and Rhythm: Normal rate and regular rhythm  Heart sounds: Normal heart sounds  Pulmonary:      Effort: Pulmonary effort is normal       Breath sounds: Normal breath sounds  Abdominal:      General: There is no distension  Palpations: Abdomen is soft  Tenderness: There is no abdominal tenderness  Genitourinary:     Comments: Vulva: sebaceous cyst of left labia at 2 o'clock otherwise normal  Vagina: normal, no lesions or ttp  Urethra: normal, no lesions, masses or ttp  Bladder: normal, no masses or ttp  Cervix: normal, no lesions, masses or CMT  Uterus: normal-size, normal mobility, good descensus  Adnexa: no masses or ttp  Musculoskeletal:         General: Normal range of motion  Cervical back: Normal range of motion  No rigidity  Skin:     General: Skin is warm and dry  Neurological:      General: No focal deficit present  Psychiatric:         Mood and Affect: Mood normal          Behavior: Behavior normal          Thought Content:  Thought content normal         Breast inspection negative, no nipple discharge or bleeding, no masses or nodularity palpable  Rectal negative, stool guaiac negative

## 2023-03-28 LAB
HPV HR 12 DNA CVX QL NAA+PROBE: NEGATIVE
HPV16 DNA CVX QL NAA+PROBE: POSITIVE
HPV18 DNA CVX QL NAA+PROBE: NEGATIVE

## 2023-03-31 LAB
LAB AP GYN PRIMARY INTERPRETATION: NORMAL
Lab: NORMAL

## 2023-05-02 ENCOUNTER — RA CDI HCC (OUTPATIENT)
Dept: OTHER | Facility: HOSPITAL | Age: 63
End: 2023-05-02

## 2023-05-02 NOTE — PROGRESS NOTES
[facility] 77  coding opportunities       Chart reviewed, no opportunity found: CHART REVIEWED, NO OPPORTUNITY FOUND        Patients Insurance        Commercial Insurance: Mandy Tello

## 2023-05-09 ENCOUNTER — OFFICE VISIT (OUTPATIENT)
Dept: GASTROENTEROLOGY | Facility: CLINIC | Age: 63
End: 2023-05-09

## 2023-05-09 ENCOUNTER — TELEPHONE (OUTPATIENT)
Dept: GASTROENTEROLOGY | Facility: CLINIC | Age: 63
End: 2023-05-09

## 2023-05-09 VITALS
SYSTOLIC BLOOD PRESSURE: 135 MMHG | BODY MASS INDEX: 32.31 KG/M2 | HEART RATE: 80 BPM | WEIGHT: 164.6 LBS | DIASTOLIC BLOOD PRESSURE: 85 MMHG | HEIGHT: 60 IN

## 2023-05-09 DIAGNOSIS — R19.5 POSITIVE COLORECTAL CANCER SCREENING USING COLOGUARD TEST: Primary | ICD-10-CM

## 2023-05-09 RX ORDER — POLYETHYLENE GLYCOL 3350, SODIUM CHLORIDE, SODIUM BICARBONATE, POTASSIUM CHLORIDE 420; 11.2; 5.72; 1.48 G/4L; G/4L; G/4L; G/4L
4000 POWDER, FOR SOLUTION ORAL ONCE
Qty: 4000 ML | Refills: 0 | Status: SHIPPED | OUTPATIENT
Start: 2023-05-09 | End: 2023-05-09

## 2023-05-09 NOTE — TELEPHONE ENCOUNTER
Scheduled date of colonoscopy (as of today):06/08/23  Physician performing colonoscopy:Layo  Location of colonoscopy:[facility]  Bowel prep reviewed with patient:Jayy  Instructions reviewed with patient by:adelita  Clearances: n/a

## 2023-05-09 NOTE — PROGRESS NOTES
[facility] - Outpatient Consultation  Sinan Garcia 63 y o  female MRN: [id number]  Encounter: [id number]        ASSESSMENT AND PLAN:       Diagnoses and all orders for this visit:    Positive colorectal cancer screening using Cologuard test    We discussed the implications of a positive fecal DNA test  -     Colonoscopy; Future  -     polyethylene glycol-electrolytes (TriLyte) 4000 mL solution; Take 4,000 mL by mouth once for 1 dose Take 4000 mL by mouth once for 1 dose  Use as directed          ______________________________________________________________________    HPI: Positive Cologuard    No symptoms, no family history      REVIEW OF SYSTEMS:    ROS     Historical Information   Past Medical History:   Diagnosis Date   • Borderline diabetes    • High blood pressure      Past Surgical History:   Procedure Laterality Date   • HEMORRHOID SURGERY     • TONSILLECTOMY     • TUBAL LIGATION       Social History   Social History     Substance and Sexual Activity   Alcohol Use Yes    Comment: 3-4 x week     Social History     Substance and Sexual Activity   Drug Use Never     Social History     Tobacco Use   Smoking Status Former   Smokeless Tobacco Never     Family History   Problem Relation Age of Onset   • Hypertension Father    • Diabetes Paternal Grandfather    • No Known Problems Paternal Aunt        Meds/Allergies       Current Outpatient Medications:   •  benzonatate (TESSALON PERLES) 100 mg capsule  •  doxazosin (CARDURA) 2 mg tablet  •  metFORMIN (GLUCOPHAGE-XR) 750 mg 24 hr tablet  •  metoprolol succinate (TOPROL-XL) 50 mg 24 hr tablet  •  mometasone (NASONEX) 50 mcg/act nasal spray  •  polyethylene glycol-electrolytes (TriLyte) 4000 mL solution  •  rosuvastatin (CRESTOR) 20 MG tablet  •  sertraline (ZOLOFT) 50 mg tablet  •  SYNTHROID 75 MCG tablet  •  traZODone (DESYREL) 50 mg tablet  •  triamterene-hydrochlorothiazide (DYAZIDE) 37 5-25 mg per capsule    Allergies   Allergen Reactions   • Augmentin [Amoxicillin-Pot Clavulanate]    • Cozaar [Losartan]            Objective     Blood pressure 135/85, pulse 80, height 5' (1 524 m), weight 74 7 kg (164 lb 9 6 oz)  Body mass index is 32 15 kg/m²  PHYSICAL EXAM:      Physical Exam         Lab Results:   No visits with results within 1 Day(s) from this visit  Latest known visit with results is:   Office Visit on 03/23/2023   Component Date Value   • Cologuard Result 04/10/2023 Positive (A)    • Case Report 03/23/2023                      Value:Gynecologic Cytology Report                       Case: [id number]                                  Authorizing Provider:  Keaton Owens MD  Collected:           03/23/2023 1540              Ordering Location:     [facility] Received:            03/23/2023 1540                                     OB/GYN Stephan Odell                                                                First Screen:          Lupe Nunez, CT                                                       Specimen:    LIQUID-BASED PAP, SCREENING, Cervix, Endocervical                                         • Primary Interpretation 03/23/2023 Negative for intraepithelial lesion or malignancy    • Specimen Adequacy 03/23/2023 Satisfactory for evaluation  (See note)    • Note 03/23/2023                      Value: This result contains rich text formatting which cannot be displayed here  • Additional Information 03/23/2023                      Value: This result contains rich text formatting which cannot be displayed here  • LMP 03/23/2023     • HPV Other HR 03/23/2023 Negative    • HPV16 03/23/2023 Positive (A)    • HPV18 03/23/2023 Negative          Radiology Results:   No results found

## 2023-05-17 NOTE — PROGRESS NOTES
"3/23/23: NILM, HPV16+  cologuard pos, seen by GI 5/9/23, plan for colonoscopy, already scheduled         Colposcopy     Date/Time 5/18/2023 2:00 PM     [facility] Protocol   Procedure performed by:  Consent: Verbal consent obtained  Risks and benefits: risks, benefits and alternatives were discussed  Consent given by: patient  Time out: Immediately prior to procedure a \"time out\" was called to verify the correct patient, procedure, equipment, support staff and site/side marked as required  Patient understanding: patient states understanding of the procedure being performed  Test results: test results available and properly labeled  Required items: required blood products, implants, devices, and special equipment available  Patient identity confirmed: verbally with patient and provided demographic data       Performed by  Emelia Kc MD     Authorized by Emelia Kc MD        Pre-procedure details     Pre-procedure timeout performed: yes      Prepped with: acetic acid       Indication    Indications: HPV16+     Procedure Details   Procedure: Colposcopy w/ cervical biopsy and ECC      Cadet speculum was placed in the vagina: yes      Under colposcopic examination the transition zone was seen in entirety: yes      Endocervix was curetted using a Kevorkian curette: yes      Cervical biopsy performed with a cervical biopsy punch: yes      Monsel's solution was applied: yes      Biopsy(s): yes      Location:  5 o'clock    Specimen to pathology: yes       Post-procedure      Findings: Punctation and White epithelium      Impression: Low grade cervical dysplasia      Patient tolerance of procedure: Tolerated well, no immediate complications     Comments       Reviewed pelvic rest for a few days  Call if any fevers, severe pain, heavy bleeding  If benign or low grade, repeat pap in 1 year  If high grade, will recommend LEEP/CKC    All questions answered to the best of my ability                  "

## 2023-05-18 ENCOUNTER — PROCEDURE VISIT (OUTPATIENT)
Dept: OBGYN CLINIC | Facility: CLINIC | Age: 63
End: 2023-05-18

## 2023-05-18 VITALS — BODY MASS INDEX: 32.22 KG/M2 | WEIGHT: 165 LBS | SYSTOLIC BLOOD PRESSURE: 142 MMHG | DIASTOLIC BLOOD PRESSURE: 80 MMHG

## 2023-05-18 DIAGNOSIS — B97.7 HPV IN FEMALE: Primary | ICD-10-CM

## 2023-05-18 RX ORDER — BIOTIN 10 MG
TABLET ORAL DAILY
COMMUNITY

## 2023-05-18 RX ORDER — DOXAZOSIN MESYLATE 4 MG/1
TABLET ORAL
COMMUNITY
Start: 2023-04-23

## 2023-05-24 PROCEDURE — 88305 TISSUE EXAM BY PATHOLOGIST: CPT | Performed by: PATHOLOGY

## 2023-05-25 ENCOUNTER — TELEPHONE (OUTPATIENT)
Dept: GASTROENTEROLOGY | Facility: CLINIC | Age: 63
End: 2023-05-25

## 2023-05-25 NOTE — TELEPHONE ENCOUNTER
lmom confirming pt's colonoscopy scheduled on 6/8/23 at [place] [address] with Dr Sherry Rosado  Informed [place] [address] would be calling the day prior with the arrival time  Informed of clear liquid diet day prior as well as the bowel cleansing preparation  Informed would need a  the day of the procedure due to being under sedation  I asked pt to please call back if has not received instructions or if has any questions

## 2023-06-08 ENCOUNTER — HOSPITAL ENCOUNTER (OUTPATIENT)
Dept: GASTROENTEROLOGY | Facility: AMBULARY SURGERY CENTER | Age: 63
Setting detail: OUTPATIENT SURGERY
End: 2023-06-08
Attending: INTERNAL MEDICINE
Payer: COMMERCIAL

## 2023-06-08 ENCOUNTER — ANESTHESIA EVENT (OUTPATIENT)
Dept: GASTROENTEROLOGY | Facility: AMBULARY SURGERY CENTER | Age: 63
End: 2023-06-08

## 2023-06-08 ENCOUNTER — ANESTHESIA (OUTPATIENT)
Dept: GASTROENTEROLOGY | Facility: AMBULARY SURGERY CENTER | Age: 63
End: 2023-06-08

## 2023-06-08 VITALS
OXYGEN SATURATION: 99 % | TEMPERATURE: 97 F | RESPIRATION RATE: 18 BRPM | SYSTOLIC BLOOD PRESSURE: 154 MMHG | DIASTOLIC BLOOD PRESSURE: 70 MMHG | HEART RATE: 80 BPM

## 2023-06-08 DIAGNOSIS — R19.5 POSITIVE COLORECTAL CANCER SCREENING USING COLOGUARD TEST: ICD-10-CM

## 2023-06-08 PROCEDURE — 88305 TISSUE EXAM BY PATHOLOGIST: CPT | Performed by: PATHOLOGY

## 2023-06-08 RX ORDER — SODIUM CHLORIDE, SODIUM LACTATE, POTASSIUM CHLORIDE, CALCIUM CHLORIDE 600; 310; 30; 20 MG/100ML; MG/100ML; MG/100ML; MG/100ML
125 INJECTION, SOLUTION INTRAVENOUS CONTINUOUS
Status: DISCONTINUED | OUTPATIENT
Start: 2023-06-08 | End: 2023-06-12 | Stop reason: HOSPADM

## 2023-06-08 RX ORDER — PROPOFOL 10 MG/ML
INJECTION, EMULSION INTRAVENOUS CONTINUOUS PRN
Status: DISCONTINUED | OUTPATIENT
Start: 2023-06-08 | End: 2023-06-08

## 2023-06-08 RX ORDER — PROPOFOL 10 MG/ML
INJECTION, EMULSION INTRAVENOUS AS NEEDED
Status: DISCONTINUED | OUTPATIENT
Start: 2023-06-08 | End: 2023-06-08

## 2023-06-08 RX ORDER — LIDOCAINE HYDROCHLORIDE 10 MG/ML
INJECTION, SOLUTION EPIDURAL; INFILTRATION; INTRACAUDAL; PERINEURAL AS NEEDED
Status: DISCONTINUED | OUTPATIENT
Start: 2023-06-08 | End: 2023-06-08

## 2023-06-08 RX ADMIN — PROPOFOL 100 MCG/KG/MIN: 10 INJECTION, EMULSION INTRAVENOUS at 09:19

## 2023-06-08 RX ADMIN — PROPOFOL 70 MG: 10 INJECTION, EMULSION INTRAVENOUS at 09:19

## 2023-06-08 RX ADMIN — LIDOCAINE HYDROCHLORIDE 5 ML: 10 INJECTION, SOLUTION EPIDURAL; INFILTRATION; INTRACAUDAL; PERINEURAL at 09:19

## 2023-06-08 RX ADMIN — PROPOFOL 60 MG: 10 INJECTION, EMULSION INTRAVENOUS at 09:23

## 2023-06-08 RX ADMIN — PROPOFOL 30 MG: 10 INJECTION, EMULSION INTRAVENOUS at 09:35

## 2023-06-08 RX ADMIN — SODIUM CHLORIDE, SODIUM LACTATE, POTASSIUM CHLORIDE, AND CALCIUM CHLORIDE 125 ML/HR: .6; .31; .03; .02 INJECTION, SOLUTION INTRAVENOUS at 07:33

## 2023-06-08 NOTE — ANESTHESIA PREPROCEDURE EVALUATION
Procedure:  COLONOSCOPY    Relevant Problems   ENDO   (+) Diabetes mellitus, type 2 (HCC)        Physical Exam    Airway    Mallampati score: II  TM Distance: >3 FB  Neck ROM: full     Dental   No notable dental hx     Cardiovascular  Cardiovascular exam normal    Pulmonary  Pulmonary exam normal     Other Findings        Anesthesia Plan  ASA Score- 2     Anesthesia Type- IV sedation with anesthesia with ASA Monitors  Additional Monitors:   Airway Plan:           Plan Factors-Exercise tolerance (METS): >4 METS  Chart reviewed  Existing labs reviewed  Patient summary reviewed  Patient is not a current smoker  Obstructive sleep apnea risk education given perioperatively  Induction-     Postoperative Plan-     Informed Consent- Anesthetic plan and risks discussed with patient  I personally reviewed this patient with the CRNA  Discussed and agreed on the Anesthesia Plan with the CRNA  Boubacar Burnham

## 2023-06-08 NOTE — H&P
History and Physical - [facility]  Sharmila Barba 63 y o  female MRN: [id number]                  HPI: Sharmila Barba is a 63y o  year old female who presents for positive Cologuard      REVIEW OF SYSTEMS: Per the HPI, and otherwise unremarkable      Historical Information   Past Medical History:   Diagnosis Date   • Borderline diabetes    • High blood pressure      Past Surgical History:   Procedure Laterality Date   • HEMORRHOID SURGERY     • TONSILLECTOMY     • TUBAL LIGATION       Social History   Social History     Substance and Sexual Activity   Alcohol Use Yes    Comment: 3-4 x week     Social History     Substance and Sexual Activity   Drug Use Never     Social History     Tobacco Use   Smoking Status Former   Smokeless Tobacco Never     Family History   Problem Relation Age of Onset   • Hypertension Father    • Diabetes Paternal Grandfather    • No Known Problems Paternal Aunt        Meds/Allergies       Current Outpatient Medications:   •  ascorbic acid (VITAMIN C) 1000 MG tablet  •  Biotin 10 MG TABS  •  co-enzyme Q-10 30 MG capsule  •  doxazosin (CARDURA) 4 mg tablet  •  metFORMIN (GLUCOPHAGE-XR) 750 mg 24 hr tablet  •  metoprolol succinate (TOPROL-XL) 50 mg 24 hr tablet  •  mometasone (NASONEX) 50 mcg/act nasal spray  •  rosuvastatin (CRESTOR) 20 MG tablet  •  sertraline (ZOLOFT) 50 mg tablet  •  SYNTHROID 75 MCG tablet  •  traZODone (DESYREL) 50 mg tablet  •  triamterene-hydrochlorothiazide (DYAZIDE) 37 5-25 mg per capsule  •  benzonatate (TESSALON PERLES) 100 mg capsule    Current Facility-Administered Medications:   •  lactated ringers infusion, 125 mL/hr, Intravenous, Continuous, 125 mL/hr at 06/08/23 0733    Allergies   Allergen Reactions   • Augmentin [Amoxicillin-Pot Clavulanate]    • Cozaar [Losartan]        Objective     /85   Pulse 75   Temp (!) 97 °F (36 1 °C) (Temporal)   Resp 18   SpO2 98%       PHYSICAL EXAM    Gen: NAD  Head: NCAT  CV: RRR  CHEST: Clear  ABD: soft, NT/ND  EXT: no edema      ASSESSMENT/PLAN:  This is a 63y o  year old female here for colonoscopy, and she is stable and optimized for her procedure

## 2023-06-08 NOTE — ANESTHESIA POSTPROCEDURE EVALUATION
Post-Op Assessment Note    CV Status:  Stable       Mental Status:  Sleepy   Hydration Status:  Stable   PONV Controlled:  Controlled   Airway Patency:  Patent      Post Op Vitals Reviewed: Yes      Staff: CRNA         No notable events documented      BP   171/88   Temp     Pulse  88   Resp   20   SpO2   98

## 2023-06-13 PROCEDURE — 88305 TISSUE EXAM BY PATHOLOGIST: CPT | Performed by: PATHOLOGY

## 2024-02-27 ENCOUNTER — TELEPHONE (OUTPATIENT)
Age: 64
End: 2024-02-27

## 2024-02-27 NOTE — TELEPHONE ENCOUNTER
Patient received letter in the mail for her annual mammogram. Please put order in, and contact patient when its done.

## 2024-02-27 NOTE — TELEPHONE ENCOUNTER
Left message for pt to call the office as she is due for her annual exam and pap.  Advised pt we would provide script for mammogram at that time.

## 2024-07-01 ENCOUNTER — ANNUAL EXAM (OUTPATIENT)
Dept: OBGYN CLINIC | Facility: CLINIC | Age: 64
End: 2024-07-01
Payer: COMMERCIAL

## 2024-07-01 VITALS
WEIGHT: 165 LBS | BODY MASS INDEX: 32.39 KG/M2 | SYSTOLIC BLOOD PRESSURE: 120 MMHG | HEIGHT: 60 IN | DIASTOLIC BLOOD PRESSURE: 78 MMHG

## 2024-07-01 DIAGNOSIS — Z12.4 SCREENING FOR CERVICAL CANCER: ICD-10-CM

## 2024-07-01 DIAGNOSIS — Z01.419 WOMEN'S ANNUAL ROUTINE GYNECOLOGICAL EXAMINATION: Primary | ICD-10-CM

## 2024-07-01 DIAGNOSIS — Z12.31 ENCOUNTER FOR SCREENING MAMMOGRAM FOR BREAST CANCER: ICD-10-CM

## 2024-07-01 PROCEDURE — G0145 SCR C/V CYTO,THINLAYER,RESCR: HCPCS | Performed by: STUDENT IN AN ORGANIZED HEALTH CARE EDUCATION/TRAINING PROGRAM

## 2024-07-01 PROCEDURE — G0476 HPV COMBO ASSAY CA SCREEN: HCPCS | Performed by: STUDENT IN AN ORGANIZED HEALTH CARE EDUCATION/TRAINING PROGRAM

## 2024-07-01 PROCEDURE — S0612 ANNUAL GYNECOLOGICAL EXAMINA: HCPCS | Performed by: STUDENT IN AN ORGANIZED HEALTH CARE EDUCATION/TRAINING PROGRAM

## 2024-07-01 NOTE — PROGRESS NOTES
[facility]  Well Woman Annual Exam  [facility]    Assessment   Brittney is a 63 y.o. postmenopausal female presenting for annual exam.     Plan   Diagnoses and all orders for this visit:    Women's annual routine gynecological examination    Screening for cervical cancer    Encounter for screening mammogram for breast cancer  -     Mammo screening bilateral w 3d & cad; Future        Pap history notable for HPV16+ last year, colpo LSIL, pap repeated today  Mammogram ordered today  Colonoscopy up to date  I emphasized the importance of an annual pelvic and breast exam.   I have discussed the importance of self-breast exams, exercise and healthy diet as well as adequate intake of calcium and vitamin D.     All questions answered to the best of my ability.    __________________________________________________________________      Subjective   Brittney is a 63 y.o. postmenopausal female presenting for annual exam. She is without complaint.    Pap 2023: NILM, HPV16+  Colposcopy 2023: LSIL    Mammo 2023:  BIRADS1  Colonoscopy 2023: benign polyps (tubular adenoma), repeat in 3 years    GYN  Menopause occurred approx 14 years ago  No bleeding  No vaginal discharge or pelvic pain  S/p salpingectomy  Menopausal symptoms: no     Sexually active: yes with , no new partners  Lives with  and two cats  Hx Abnormal pap: yes, very remote in 20s    OB          Complaints: denies  Denies hematuria, urinary incontinence and dysuria     BREAST  Complaints: denies  Denies: breast lump, nipple discharge and skin lesion(s)  Last mammogram: 3/2023    Family hx: denies fhx of breast, uterine, ovarian, or colon cancers     GENERAL  PMH reviewed/updated and is as below.   Patient does follow with a PCP.  Works as a works for [employer].  Denies domestic violence.  Exercise: goes to [employer], walks, weights  Diet: takes multivitamin and vitamin D          Past Medical History:   Diagnosis Date    Abnormal Pap  smear of cervix 2023    Borderline diabetes     High blood pressure     HPV in female 2023       Past Surgical History:   Procedure Laterality Date    HEMORRHOID SURGERY      TONSILLECTOMY      TUBAL LIGATION           Current Outpatient Medications:     ascorbic acid (VITAMIN C) 1000 MG tablet, Take 1,000 mg by mouth daily, Disp: , Rfl:     Biotin 10 MG TABS, Take by mouth daily, Disp: , Rfl:     co-enzyme Q-10 30 MG capsule, Take 30 mg by mouth Three times a day, Disp: , Rfl:     doxazosin (CARDURA) 4 mg tablet, TAKE 1/2 TABLET (4MG) BY MOUTH IN THE MORMING AND IN THE EVENING, Disp: , Rfl:     metFORMIN (GLUCOPHAGE-XR) 750 mg 24 hr tablet, Take 1,500 mg by mouth daily, Disp: , Rfl: 0    metoprolol succinate (TOPROL-XL) 50 mg 24 hr tablet, , Disp: , Rfl: 0    mometasone (NASONEX) 50 mcg/act nasal spray, , Disp: , Rfl: 0    rosuvastatin (CRESTOR) 20 MG tablet, , Disp: , Rfl: 0    sertraline (ZOLOFT) 50 mg tablet, , Disp: , Rfl: 0    SYNTHROID 75 MCG tablet, Take 75 mcg by mouth daily, Disp: , Rfl: 0    traZODone (DESYREL) 50 mg tablet, , Disp: , Rfl: 0    triamterene-hydrochlorothiazide (DYAZIDE) 37.5-25 mg per capsule, , Disp: , Rfl: 0    benzonatate (TESSALON PERLES) 100 mg capsule, Take 1 capsule (100 mg total) by mouth 3 (three) times a day as needed for cough (Patient not taking: Reported on 7/1/2024), Disp: 20 capsule, Rfl: 0    Allergies   Allergen Reactions    Augmentin [Amoxicillin-Pot Clavulanate]     Cozaar [Losartan]        Social History     Socioeconomic History    Marital status: /Civil Union     Spouse name: Not on file    Number of children: Not on file    Years of education: Not on file    Highest education level: Not on file   Occupational History    Not on file   Tobacco Use    Smoking status: Former    Smokeless tobacco: Never   Vaping Use    Vaping status: Former   Substance and Sexual Activity    Alcohol use: Yes     Comment: 3-4 x week    Drug use: Never    Sexual activity: Yes      Birth control/protection: Female Sterilization   Other Topics Concern    Not on file   Social History Narrative    Not on file     Social Determinants of Health     Financial Resource Strain: Not on file   Food Insecurity: Not on file   Transportation Needs: Not on file   Physical Activity: Not on file   Stress: Not on file   Social Connections: Not on file   Intimate Partner Violence: Not on file   Housing Stability: Not on file            Review of Systems  Review of Systems   Respiratory:  Negative for shortness of breath.    Cardiovascular:  Negative for chest pain.   Gastrointestinal:  Negative for abdominal pain.   Genitourinary:  Negative for pelvic pain, vaginal bleeding and vaginal discharge.       Objective  /78 (BP Location: Right arm, Patient Position: Sitting)   Ht 5' (1.524 m)   Wt 74.8 kg (165 lb)   BMI 32.22 kg/m²      Physical Exam:  Physical Exam  Constitutional:       Appearance: Normal appearance.   HENT:      Head: Normocephalic and atraumatic.      Nose: Nose normal.   Eyes:      Extraocular Movements: Extraocular movements intact.      Conjunctiva/sclera: Conjunctivae normal.   Neck:      Comments: No thyromegaly or palpable thyroid nodules  Cardiovascular:      Rate and Rhythm: Normal rate and regular rhythm.      Heart sounds: Normal heart sounds.   Pulmonary:      Effort: Pulmonary effort is normal.      Breath sounds: Normal breath sounds.   Abdominal:      General: Abdomen is flat. There is no distension.      Palpations: Abdomen is soft.      Tenderness: There is no abdominal tenderness.   Genitourinary:     Comments: Vulva: normal, no lesions  Vagina: normal, no lesions or ttp  Urethra: normal, no lesions, masses or ttp  Bladder: normal, no masses or ttp  Cervix: normal, no lesions, masses or CMT  Uterus: normal-size, normal mobility, good descensus  Adnexa: no masses or ttp  Pelvic floor muscle strength intact, no ttp  Musculoskeletal:         General: Normal range of motion.       Cervical back: Normal range of motion.   Skin:     General: Skin is warm and dry.   Neurological:      General: No focal deficit present.      Mental Status: She is alert.   Psychiatric:         Mood and Affect: Mood normal.         Behavior: Behavior normal.         Thought Content: Thought content normal.       Breast inspection negative, no nipple discharge or bleeding, no masses or nodularity palpable  Rectal deferred

## 2024-07-02 LAB
HPV HR 12 DNA CVX QL NAA+PROBE: NEGATIVE
HPV16 DNA CVX QL NAA+PROBE: NEGATIVE
HPV18 DNA CVX QL NAA+PROBE: NEGATIVE

## 2024-07-08 LAB
LAB AP GYN PRIMARY INTERPRETATION: NORMAL
Lab: NORMAL

## 2024-10-22 ENCOUNTER — HOSPITAL ENCOUNTER (OUTPATIENT)
Dept: RADIOLOGY | Facility: HOSPITAL | Age: 64
Discharge: HOME | End: 2024-10-22
Attending: STUDENT IN AN ORGANIZED HEALTH CARE EDUCATION/TRAINING PROGRAM
Payer: COMMERCIAL

## 2024-10-22 VITALS — BODY MASS INDEX: 31.02 KG/M2 | HEIGHT: 60 IN | WEIGHT: 158 LBS

## 2024-10-22 DIAGNOSIS — Z12.31 ENCOUNTER FOR SCREENING MAMMOGRAM FOR BREAST CANCER: ICD-10-CM

## 2024-10-22 PROCEDURE — 77067 SCR MAMMO BI INCL CAD: CPT

## 2024-10-22 PROCEDURE — 77063 BREAST TOMOSYNTHESIS BI: CPT

## 2024-11-04 ENCOUNTER — TELEPHONE (OUTPATIENT)
Age: 64
End: 2024-11-04

## 2024-11-04 NOTE — TELEPHONE ENCOUNTER
Pt returned call to review mammo results. Results read verbatim. Pt verbalized understanding, no further questions at this time.